# Patient Record
Sex: MALE | Race: WHITE | HISPANIC OR LATINO | Employment: FULL TIME | URBAN - METROPOLITAN AREA
[De-identification: names, ages, dates, MRNs, and addresses within clinical notes are randomized per-mention and may not be internally consistent; named-entity substitution may affect disease eponyms.]

---

## 2019-03-23 ENCOUNTER — OFFICE VISIT (OUTPATIENT)
Dept: URGENT CARE | Facility: PHYSICIAN GROUP | Age: 39
End: 2019-03-23

## 2019-03-23 VITALS
BODY MASS INDEX: 19.9 KG/M2 | OXYGEN SATURATION: 99 % | HEIGHT: 70 IN | HEART RATE: 89 BPM | SYSTOLIC BLOOD PRESSURE: 120 MMHG | DIASTOLIC BLOOD PRESSURE: 72 MMHG | WEIGHT: 139 LBS | RESPIRATION RATE: 14 BRPM | TEMPERATURE: 97.9 F

## 2019-03-23 DIAGNOSIS — Z02.5 SPORTS PHYSICAL: ICD-10-CM

## 2019-03-23 PROCEDURE — 7101 PR PHYSICAL: Performed by: PHYSICIAN ASSISTANT

## 2019-03-23 ASSESSMENT — LIFESTYLE VARIABLES: SUBSTANCE_ABUSE: 0

## 2019-03-23 ASSESSMENT — ENCOUNTER SYMPTOMS
SEIZURES: 0
DEPRESSION: 0
HEADACHES: 0
LOSS OF CONSCIOUSNESS: 0
BRUISES/BLEEDS EASILY: 0
FOCAL WEAKNESS: 0
DIZZINESS: 0

## 2019-03-23 NOTE — PROGRESS NOTES
"  Subjective:   Otf Lara is a 38 y.o. male who presents today with No chief complaint on file.    HPI  PT presents for sports physical today and has no current complaints.   See scanned sports physical and health questionnaire. No PMH/FH congenital cardiac. No PMH concussion. Exam normal.     PMH:  has no past medical history on file.  MEDS: No current outpatient prescriptions on file.  ALLERGIES: No Known Allergies  SURGHX: History reviewed. No pertinent surgical history.  SOCHX:  reports that he has never smoked. He has never used smokeless tobacco.  FH: Reviewed with patient, not pertinent to this visit.       Review of Systems   Neurological: Negative for dizziness, focal weakness, seizures, loss of consciousness and headaches.   Endo/Heme/Allergies: Does not bruise/bleed easily.   Psychiatric/Behavioral: Negative for depression, substance abuse and suicidal ideas.         Objective:   /72   Pulse 89   Temp 36.6 °C (97.9 °F)   Resp 14   Ht 1.778 m (5' 10\")   Wt 63 kg (139 lb)   SpO2 99%   BMI 19.94 kg/m²   Physical Exam   Constitutional: Vital signs are normal. He appears well-developed and well-nourished. No distress.   HENT:   Head: Normocephalic and atraumatic.   Eyes: Pupils are equal, round, and reactive to light.   Neck: Neck supple.   Cardiovascular: Normal rate, regular rhythm and normal heart sounds.    Pulmonary/Chest: Effort normal.   Musculoskeletal:   Normal movement in all 4 extremities   Neurological: He is alert. Coordination normal.   Skin: Skin is warm and dry.   Psychiatric: He has a normal mood and affect.   Nursing note and vitals reviewed.        Assessment/Plan:   Assessment    1. 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 His physical  and he comes in today for an updated physical. This is a very healthy gentleman. No medical issues or concerns today.  See scanned sports physical    Alexis Walker " SANDY